# Patient Record
Sex: FEMALE | Race: AMERICAN INDIAN OR ALASKA NATIVE | NOT HISPANIC OR LATINO | Employment: UNEMPLOYED | URBAN - METROPOLITAN AREA
[De-identification: names, ages, dates, MRNs, and addresses within clinical notes are randomized per-mention and may not be internally consistent; named-entity substitution may affect disease eponyms.]

---

## 2017-10-31 ENCOUNTER — TRANSFERRED RECORDS (OUTPATIENT)
Dept: HEALTH INFORMATION MANAGEMENT | Facility: CLINIC | Age: 57
End: 2017-10-31

## 2017-11-02 ENCOUNTER — MEDICAL CORRESPONDENCE (OUTPATIENT)
Dept: HEALTH INFORMATION MANAGEMENT | Facility: CLINIC | Age: 57
End: 2017-11-02

## 2017-12-18 DIAGNOSIS — K76.0 FATTY LIVER: Primary | ICD-10-CM

## 2018-02-07 ENCOUNTER — OFFICE VISIT (OUTPATIENT)
Dept: GASTROENTEROLOGY | Facility: CLINIC | Age: 58
End: 2018-02-07
Attending: INTERNAL MEDICINE
Payer: MEDICARE

## 2018-02-07 VITALS
HEIGHT: 65 IN | SYSTOLIC BLOOD PRESSURE: 149 MMHG | HEART RATE: 73 BPM | TEMPERATURE: 98.2 F | DIASTOLIC BLOOD PRESSURE: 73 MMHG | WEIGHT: 146.5 LBS | BODY MASS INDEX: 24.41 KG/M2 | OXYGEN SATURATION: 98 % | RESPIRATION RATE: 16 BRPM

## 2018-02-07 DIAGNOSIS — K76.0 FATTY LIVER DISEASE, NONALCOHOLIC: Primary | ICD-10-CM

## 2018-02-07 DIAGNOSIS — K76.0 FATTY LIVER: ICD-10-CM

## 2018-02-07 LAB
ALBUMIN SERPL-MCNC: 4.2 G/DL (ref 3.4–5)
ALP SERPL-CCNC: 58 U/L (ref 40–150)
ALT SERPL W P-5'-P-CCNC: 31 U/L (ref 0–50)
ANION GAP SERPL CALCULATED.3IONS-SCNC: 6 MMOL/L (ref 3–14)
AST SERPL W P-5'-P-CCNC: 26 U/L (ref 0–45)
BILIRUB DIRECT SERPL-MCNC: <0.1 MG/DL (ref 0–0.2)
BILIRUB SERPL-MCNC: 0.3 MG/DL (ref 0.2–1.3)
BUN SERPL-MCNC: 8 MG/DL (ref 7–30)
CALCIUM SERPL-MCNC: 9 MG/DL (ref 8.5–10.1)
CHLORIDE SERPL-SCNC: 104 MMOL/L (ref 94–109)
CO2 SERPL-SCNC: 30 MMOL/L (ref 20–32)
CREAT SERPL-MCNC: 0.72 MG/DL (ref 0.52–1.04)
ERYTHROCYTE [DISTWIDTH] IN BLOOD BY AUTOMATED COUNT: 12.8 % (ref 10–15)
GFR SERPL CREATININE-BSD FRML MDRD: 83 ML/MIN/1.7M2
GLUCOSE SERPL-MCNC: 87 MG/DL (ref 70–99)
HCT VFR BLD AUTO: 40.7 % (ref 35–47)
HGB BLD-MCNC: 13.1 G/DL (ref 11.7–15.7)
INR PPP: 0.96 (ref 0.86–1.14)
MCH RBC QN AUTO: 29.6 PG (ref 26.5–33)
MCHC RBC AUTO-ENTMCNC: 32.2 G/DL (ref 31.5–36.5)
MCV RBC AUTO: 92 FL (ref 78–100)
PLATELET # BLD AUTO: 340 10E9/L (ref 150–450)
POTASSIUM SERPL-SCNC: 3.8 MMOL/L (ref 3.4–5.3)
PROT SERPL-MCNC: 8.2 G/DL (ref 6.8–8.8)
RBC # BLD AUTO: 4.42 10E12/L (ref 3.8–5.2)
SODIUM SERPL-SCNC: 140 MMOL/L (ref 133–144)
WBC # BLD AUTO: 7.3 10E9/L (ref 4–11)

## 2018-02-07 PROCEDURE — 80048 BASIC METABOLIC PNL TOTAL CA: CPT | Performed by: INTERNAL MEDICINE

## 2018-02-07 PROCEDURE — 85027 COMPLETE CBC AUTOMATED: CPT | Performed by: INTERNAL MEDICINE

## 2018-02-07 PROCEDURE — 85610 PROTHROMBIN TIME: CPT | Performed by: INTERNAL MEDICINE

## 2018-02-07 PROCEDURE — 36415 COLL VENOUS BLD VENIPUNCTURE: CPT | Performed by: INTERNAL MEDICINE

## 2018-02-07 PROCEDURE — 80076 HEPATIC FUNCTION PANEL: CPT | Performed by: INTERNAL MEDICINE

## 2018-02-07 PROCEDURE — G0463 HOSPITAL OUTPT CLINIC VISIT: HCPCS | Mod: ZF

## 2018-02-07 RX ORDER — VALACYCLOVIR HYDROCHLORIDE 500 MG/1
500 TABLET, FILM COATED ORAL 2 TIMES DAILY
COMMUNITY

## 2018-02-07 RX ORDER — OXYCODONE HYDROCHLORIDE 15 MG/1
15 TABLET, FILM COATED, EXTENDED RELEASE ORAL DAILY
COMMUNITY

## 2018-02-07 RX ORDER — NORTRIPTYLINE HCL 10 MG
10 CAPSULE ORAL AT BEDTIME
COMMUNITY

## 2018-02-07 RX ORDER — HYDROMORPHONE HYDROCHLORIDE 4 MG/1
4 TABLET ORAL EVERY 4 HOURS PRN
COMMUNITY

## 2018-02-07 RX ORDER — ONDANSETRON 4 MG/1
4 TABLET, ORALLY DISINTEGRATING ORAL EVERY 8 HOURS PRN
COMMUNITY

## 2018-02-07 RX ORDER — LACTULOSE 10 G/15ML
10 SOLUTION ORAL DAILY
COMMUNITY

## 2018-02-07 ASSESSMENT — PAIN SCALES - GENERAL: PAINLEVEL: NO PAIN (1)

## 2018-02-07 NOTE — NURSING NOTE
"Chief Complaint   Patient presents with     Consult     Steatosis of liver       Initial /73 (BP Location: Left arm, Patient Position: Sitting, Cuff Size: Adult Regular)  Pulse 73  Temp 98.2  F (36.8  C) (Oral)  Resp 16  Ht 1.651 m (5' 5\")  Wt 66.5 kg (146 lb 8 oz)  SpO2 98%  BMI 24.38 kg/m2 Estimated body mass index is 24.38 kg/(m^2) as calculated from the following:    Height as of this encounter: 1.651 m (5' 5\").    Weight as of this encounter: 66.5 kg (146 lb 8 oz).  Medication Reconciliation: complete     Consuelo Powers [facility]  2/7/2018 12:14 PM        "

## 2018-02-07 NOTE — PROGRESS NOTES
Hepatology Clinic note  Xiomara Rowell   Date of Birth 1960  Date of Service 2/7/2018    REASON FOR CONSULTATION: fatty liver  REFERRING PROVIDER: Dr. Corinna Melchor    -----------------------------------------------------       HPI:   Xiomara Rowell is a 57 year old  female with PMH of high cholesterol, anxiety, depression, on chronic narcotics secondary to back pain presenting for the evaluation of fatty liver.     Patient states was she first told she had fatty liver disease in 2012 and was told to lose weight. Over the following few years, she lost 40 lbs which she eventually felt was too thin. She purposefully gained some weight by eating nightly cereal and whole milk and has been maintained her weight at 145 lbs for the past few years. Per outside records in [facility], patient had elevated LFT's in May 2017. Repeat liver function tests in July 2017 were normal. She denies any changes in her diet or activity during that time. She denies any changes in medications or illnesses to her knowledge. Per outside records, she also had a fibrosis scan showing F0 to F1. EGD in August 2017 showed chronic gastritis, biopsy findings showed chronic focal active gastritis. Negative small bowel biopsies. Today, her ALT/AST are normal. TBili 0.9 Alk Phos 58 Platelets 340 INR 0.96. Her glucose is 87.     Patient denies jaundice, lower extremity edema or confusion. No history of melena, hematemesis or hematochezia. Patient denies fevers, sweats, chills or weight loss.     She does note some abdominal distension. She now eats a primarily planted based diet. She does note some intolerance to wheat products, potatoes, certain meats. She has not tolerated statins in the past. She denies any new medications in the past six months, she is taking decreasing amount of narcotics. She takes lactulose as needed for constipation.     She is a current smoker, 3 cigarettes a day. She started smoking 9 years ago.  Patient does not currently drink. She first started drinking at the age of 15 years. At her peak, she may have drank 12 beers on the weekend which lasted 6 months, which she attributed to post-partem depression. She quit drinking at the age of 40. Last ETOH use was 2009. No previous IV/IN drug use. First tattoo at the age of 16. Patient currently in [place], lives by herself. Children are healthy    Previous work-up:  HCV antibody: negative  HAV Ab: negative  HBV SAb: <1.0     HBV SAg: negative  HBV CAb negative  JOHN: negative  AMSA: negative  AMA: negative  Ferritin: normal   TTG IGG: negative  TTG IGA: negative    Medical hx Surgical hx   Past Medical History:   Diagnosis Date     Allergic state      Anxiety      Arthritis      Depressive disorder     Past Surgical History:   Procedure Laterality Date     CHOLECYSTECTOMY       FUSION CERVICAL ANTERIOR THREE+ LEVELS                   Medications:     Current Outpatient Prescriptions   Medication     oxyCODONE (OXYCONTIN) 15 MG 12 hr tablet     nortriptyline (PAMELOR) 10 MG capsule     HYDROmorphone (DILAUDID) 4 MG tablet     ondansetron (ZOFRAN-ODT) 4 MG ODT tab     lactulose (CHRONULAC) 10 GM/15ML solution     oxyCODONE (ROXICODONE) 10 MG immediate release tablet     valACYclovir (VALTREX) 500 MG tablet     No current facility-administered medications for this visit.             Allergies:     Allergies   Allergen Reactions     Ibuprofen Anaphylaxis     Shrimp Anaphylaxis     [place]-D [Diphenhydramine] Hives     Cymbalta Other (See Comments)     Increases her depression     Lexapro [Escitalopram] Other (See Comments)     Dry mouth, stiff neck     Oxymetazoline Hives     Wellbutrin [Bupropion] Other (See Comments)            Social History:     Social History     Social History     Marital status:      Spouse name: N/A     Number of children: N/A     Years of education: N/A     Occupational History     Not on file.     Social History Main Topics     Smoking  "status: Current Every Day Smoker     Packs/day: 0.25     Years: 6.00     Smokeless tobacco: Never Used     Alcohol use No     Drug use: No     Sexual activity: Not on file     Other Topics Concern     Not on file     Social History Narrative            Family History:   No family history on file.           Review of Systems:   Gen: See HPI   HEENT: No change in vision or hearing, mouth sores, dysphagia, lymph nodes  Resp: No shortness of breath, coughing, hx of asthma  CV: No chest pain, palpitations, syncope   GI: See HPI  : No dysuria, history of stones, urine color    Skin: No rash; no pruritus or psoriasis  MS: No arthralgias, myalgias, joint swelling  Neuro: No memory changes, confusion, numbness    Heme: No difficulty clotting, bruising, bleeding  Psych:  No anxiety, depression, agitation          Physical Exam:   VS:  /73 (BP Location: Left arm, Patient Position: Sitting, Cuff Size: Adult Regular)  Pulse 73  Temp 98.2  F (36.8  C) (Oral)  Resp 16  Ht 1.651 m (5' 5\")  Wt 66.5 kg (146 lb 8 oz)  SpO2 98%  BMI 24.38 kg/m2      Gen: A&Ox3, NAD, well developed  HEENT: non-icteric, no cervical lymphadenopathy, no lesions or ulcers in oropharynx  CV: RRR, no overt murmurs  Lung: CTA Bilatererally, no wheezing or crackles.   Lym- no palpable lymphadenopathy  Abd: soft, NT, ND. no palpable splenomegaly, liver is not palpable.  Ext: no edema, intact pulses. Tattoos on extremity.   Skin: No rash, no palmar erythema, telangiectasias or jaundice  Neuro: grossly intact, no asterixis   Psych: appropriate mood and affects, mildly anxious         Data:   Reviewed in person and significant for:    Lab Results   Component Value Date     02/07/2018      Lab Results   Component Value Date    POTASSIUM 3.8 02/07/2018     Lab Results   Component Value Date    CHLORIDE 104 02/07/2018     Lab Results   Component Value Date    CO2 30 02/07/2018     Lab Results   Component Value Date    BUN 8 02/07/2018     Lab " Results   Component Value Date    CR 0.72 02/07/2018       Lab Results   Component Value Date    WBC 7.3 02/07/2018     Lab Results   Component Value Date    HGB 13.1 02/07/2018     Lab Results   Component Value Date    HCT 40.7 02/07/2018     Lab Results   Component Value Date    MCV 92 02/07/2018     Lab Results   Component Value Date     02/07/2018       Lab Results   Component Value Date    AST 26 02/07/2018     Lab Results   Component Value Date    ALT 31 02/07/2018     No results found for: BILICONJ   Lab Results   Component Value Date    BILITOTAL 0.3 02/07/2018       Lab Results   Component Value Date    ALBUMIN 4.2 02/07/2018     Lab Results   Component Value Date    PROTTOTAL 8.2 02/07/2018      Lab Results   Component Value Date    ALKPHOS 58 02/07/2018       Lab Results   Component Value Date    INR 0.96 02/07/2018     Radiology:   US ABDDOMEN  7/27/17:  The pancreas tail is obscured. Otherwise, main body and head appear negative. Right kidney appears negative measuring 11.3 cm without cortical mass or obstruction. Post cholecystectomy with normal sized common bile duct. The liver appears negative, measures 16 cm. No liver mass is seen.    US Liver Fibroscan:   7/27/17:   1. METAVIR score F0 to F1 compatible with normal to mild fibrosis.  2. S3 score, severe steatosis.           Assessment/plan:   Xiomara Rowell is a 57 year old female referred for the evaluation of fatty liver. Patient has normal liver function and liver function tests today. No recent radiographic evidence of fatty liver. Her significant weight loss in recent years and maintenance of weight loss was instrumental in preventing disease progression. The mild, intermittent right upper quadrant that she experiences is not uncommon with individuals with nonalcoholic fatty liver disease. Discussed the prevalence, significance and natural progression of fatty liver disease, especially in the  population.        -  Maintain current weight  - Continue to eat healthy diet, especially monitoring carbohydrate intake and having regular exercise  - Follow-up in clinic as needed    Noted prepared by:   Rach Arevalo PA-C   [facility] Hepat[facility]     Xiomara Rowell was seen and evaluated today as apart of a shared APRN/PA visit. I reviewed today's history of liver . My key exam findings include no truncal obesity, anicteric sclera, no jaundice. Key management decisions made by me and carried out under my direction include: maintain current weight, continue to eat healthy diet, especially carbohydrate intake and regular exercise, follow-up in clinic as needed    Kwadwo Ragsdale MD      Professor of Medicine  [employer]      Executive Medical Director, Solid Organ Transplant Program  [facility]

## 2018-02-07 NOTE — LETTER
2/7/2018       RE: Xiomara Rowell  [address] [place] [id number]     Dear Colleague,    Thank you for referring your patient, Xiomara Rowell, to the [facility] at [facility]. Please see a copy of my visit note below.    Hepatology Clinic note  Xiomara Rowell   Date of Birth 1960  Date of Service 2/7/2018    REASON FOR CONSULTATION: fatty liver  REFERRING PROVIDER: Dr. Corinna Melchor    -----------------------------------------------------       HPI:   Xiomara Rowell is a 57 year old  female with PMH of high cholesterol, anxiety, depression, on chronic narcotics secondary to back pain presenting for the evaluation of fatty liver.     Patient states was she first told she had fatty liver disease in 2012 and was told to lose weight. Over the following few years, she lost 40 lbs which she eventually felt was too thin. She purposefully gained some weight by eating nightly cereal and whole milk and has been maintained her weight at 145 lbs for the past few years. Per outside records in [facility], patient had elevated LFT's in May 2017. Repeat liver function tests in July 2017 were normal. She denies any changes in her diet or activity during that time. She denies any changes in medications or illnesses to her knowledge. Per outside records, she also had a fibrosis scan showing F0 to F1. EGD in August 2017 showed chronic gastritis, biopsy findings showed chronic focal active gastritis. Negative small bowel biopsies. Today, her ALT/AST are normal. TBili 0.9 Alk Phos 58 Platelets 340 INR 0.96. Her glucose is 87.     Patient denies jaundice, lower extremity edema or confusion. No history of melena, hematemesis or hematochezia. Patient denies fevers, sweats, chills or weight loss.     She does note some abdominal distension. She now eats a primarily planted based diet. She does note some intolerance to wheat products, potatoes,  certain meats. She has not tolerated statins in the past. She denies any new medications in the past six months, she is taking decreasing amount of narcotics. She takes lactulose as needed for constipation.     She is a current smoker, 3 cigarettes a day. She started smoking 9 years ago. Patient does not currently drink. She first started drinking at the age of 15 years. At her peak, she may have drank 12 beers on the weekend which lasted 6 months, which she attributed to post-partem depression. She quit drinking at the age of 40. Last ETOH use was 2009. No previous IV/IN drug use. First tattoo at the age of 16. Patient currently in [place], lives by herself. Children are healthy    Previous work-up:  HCV antibody: negative  HAV Ab: negative  HBV SAb: <1.0     HBV SAg: negative  HBV CAb negative  JOHN: negative  AMSA: negative  AMA: negative  Ferritin: normal   TTG IGG: negative  TTG IGA: negative    Medical hx Surgical hx   Past Medical History:   Diagnosis Date     Allergic state      Anxiety      Arthritis      Depressive disorder     Past Surgical History:   Procedure Laterality Date     CHOLECYSTECTOMY       FUSION CERVICAL ANTERIOR THREE+ LEVELS                   Medications:     Current Outpatient Prescriptions   Medication     oxyCODONE (OXYCONTIN) 15 MG 12 hr tablet     nortriptyline (PAMELOR) 10 MG capsule     HYDROmorphone (DILAUDID) 4 MG tablet     ondansetron (ZOFRAN-ODT) 4 MG ODT tab     lactulose (CHRONULAC) 10 GM/15ML solution     oxyCODONE (ROXICODONE) 10 MG immediate release tablet     valACYclovir (VALTREX) 500 MG tablet     No current facility-administered medications for this visit.             Allergies:     Allergies   Allergen Reactions     Ibuprofen Anaphylaxis     Shrimp Anaphylaxis     [place]-D [Diphenhydramine] Hives     Cymbalta Other (See Comments)     Increases her depression     Lexapro [Escitalopram] Other (See Comments)     Dry mouth, stiff neck     Oxymetazoline Hives      "Wellbutrin [Bupropion] Other (See Comments)            Social History:     Social History     Social History     Marital status:      Spouse name: N/A     Number of children: N/A     Years of education: N/A     Occupational History     Not on file.     Social History Main Topics     Smoking status: Current Every Day Smoker     Packs/day: 0.25     Years: 6.00     Smokeless tobacco: Never Used     Alcohol use No     Drug use: No     Sexual activity: Not on file     Other Topics Concern     Not on file     Social History Narrative            Family History:   No family history on file.           Review of Systems:   Gen: See HPI   HEENT: No change in vision or hearing, mouth sores, dysphagia, lymph nodes  Resp: No shortness of breath, coughing, hx of asthma  CV: No chest pain, palpitations, syncope   GI: See HPI  : No dysuria, history of stones, urine color    Skin: No rash; no pruritus or psoriasis  MS: No arthralgias, myalgias, joint swelling  Neuro: No memory changes, confusion, numbness    Heme: No difficulty clotting, bruising, bleeding  Psych:  No anxiety, depression, agitation          Physical Exam:   VS:  /73 (BP Location: Left arm, Patient Position: Sitting, Cuff Size: Adult Regular)  Pulse 73  Temp 98.2  F (36.8  C) (Oral)  Resp 16  Ht 1.651 m (5' 5\")  Wt 66.5 kg (146 lb 8 oz)  SpO2 98%  BMI 24.38 kg/m2      Gen: A&Ox3, NAD, well developed  HEENT: non-icteric, no cervical lymphadenopathy, no lesions or ulcers in oropharynx  CV: RRR, no overt murmurs  Lung: CTA Bilatererally, no wheezing or crackles.   Lym- no palpable lymphadenopathy  Abd: soft, NT, ND. no palpable splenomegaly, liver is not palpable.  Ext: no edema, intact pulses. Tattoos on extremity.   Skin: No rash, no palmar erythema, telangiectasias or jaundice  Neuro: grossly intact, no asterixis   Psych: appropriate mood and affects, mildly anxious         Data:   Reviewed in person and significant for:    Lab Results "   Component Value Date     02/07/2018      Lab Results   Component Value Date    POTASSIUM 3.8 02/07/2018     Lab Results   Component Value Date    CHLORIDE 104 02/07/2018     Lab Results   Component Value Date    CO2 30 02/07/2018     Lab Results   Component Value Date    BUN 8 02/07/2018     Lab Results   Component Value Date    CR 0.72 02/07/2018       Lab Results   Component Value Date    WBC 7.3 02/07/2018     Lab Results   Component Value Date    HGB 13.1 02/07/2018     Lab Results   Component Value Date    HCT 40.7 02/07/2018     Lab Results   Component Value Date    MCV 92 02/07/2018     Lab Results   Component Value Date     02/07/2018       Lab Results   Component Value Date    AST 26 02/07/2018     Lab Results   Component Value Date    ALT 31 02/07/2018     No results found for: BILICONJ   Lab Results   Component Value Date    BILITOTAL 0.3 02/07/2018       Lab Results   Component Value Date    ALBUMIN 4.2 02/07/2018     Lab Results   Component Value Date    PROTTOTAL 8.2 02/07/2018      Lab Results   Component Value Date    ALKPHOS 58 02/07/2018       Lab Results   Component Value Date    INR 0.96 02/07/2018     Radiology:   US ABDDOMEN  7/27/17:  The pancreas tail is obscured. Otherwise, main body and head appear negative. Right kidney appears negative measuring 11.3 cm without cortical mass or obstruction. Post cholecystectomy with normal sized common bile duct. The liver appears negative, measures 16 cm. No liver mass is seen.    US Liver Fibroscan:   7/27/17:   1. METAVIR score F0 to F1 compatible with normal to mild fibrosis.  2. S3 score, severe steatosis.           Assessment/plan:   Xiomara Rowell is a 57 year old female referred for the evaluation of fatty liver. Patient has normal liver function and liver function tests today. No recent radiographic evidence of fatty liver. Her significant weight loss in recent years and maintenance of weight loss was instrumental in  preventing disease progression. The mild, intermittent right upper quadrant that she experiences is not uncommon with individuals with nonalcoholic fatty liver disease. Discussed the prevalence, significance and natural progression of fatty liver disease, especially in the  population.        - Maintain current weight  - Continue to eat healthy diet, especially monitoring carbohydrate intake and having regular exercise  - Follow-up in clinic as needed    Noted prepared by:   Rach Arevalo PA-C   [facility] Hepatology     Xiomara Rowell was seen and evaluated today as apart of a shared APRN/PA visit. I reviewed today's history of liver . My key exam findings include no truncal obesity, anicteric sclera, no jaundice. Key management decisions made by me and carried out under my direction include: maintain current weight, continue to eat healthy diet, especially carbohydrate intake and regular exercise, follow-up in clinic as needed    Kwadwo Ragsdale MD      Professor of Medicine  [employer]      Executive Medical Director, Solid Organ Transplant Program  [facility]

## 2018-02-07 NOTE — LETTER
2/7/2018        RE: Xiomara Rowell  [address] [place] [id number]     Dear Colleague,    Thank you for referring your patient, Xiomara Rowell, to the [facility] HEPATOLOGY at [facility]. Please see a copy of my visit note below.    Hepatology Clinic note  Xiomara Rowell   Date of Birth 1960  Date of Service 2/7/2018    REASON FOR CONSULTATION: fatty liver  REFERRING PROVIDER: Dr. Corinna Melchor    -----------------------------------------------------       HPI:   Xiomara Rowell is a 57 year old  female with PMH of high cholesterol, anxiety, depression, on chronic narcotics secondary to back pain presenting for the evaluation of fatty liver.     Patient states was she first told she had fatty liver disease in 2012 and was told to lose weight. Over the following few years, she lost 40 lbs which she eventually felt was too thin. She purposefully gained some weight by eating nightly cereal and whole milk and has been maintained her weight at 145 lbs for the past few years. Per outside records in [facility], patient had elevated LFT's in May 2017. Repeat liver function tests in July 2017 were normal. She denies any changes in her diet or activity during that time. She denies any changes in medications or illnesses to her knowledge. Per outside records, she also had a fibrosis scan showing F0 to F1. EGD in August 2017 showed chronic gastritis, biopsy findings showed chronic focal active gastritis. Negative small bowel biopsies. Today, her ALT/AST are normal. TBili 0.9 Alk Phos 58 Platelets 340 INR 0.96. Her glucose is 87.     Patient denies jaundice, lower extremity edema or confusion. No history of melena, hematemesis or hematochezia. Patient denies fevers, sweats, chills or weight loss.     She does note some abdominal distension. She now eats a primarily planted based diet. She does note some intolerance to wheat products, potatoes,  certain meats. She has not tolerated statins in the past. She denies any new medications in the past six months, she is taking decreasing amount of narcotics. She takes lactulose as needed for constipation.     She is a current smoker, 3 cigarettes a day. She started smoking 9 years ago. Patient does not currently drink. She first started drinking at the age of 15 years. At her peak, she may have drank 12 beers on the weekend which lasted 6 months, which she attributed to post-partem depression. She quit drinking at the age of 40. Last ETOH use was 2009. No previous IV/IN drug use. First tattoo at the age of 16. Patient currently in [place], lives by herself. Children are healthy    Previous work-up:  HCV antibody: negative  HAV Ab: negative  HBV SAb: <1.0     HBV SAg: negative  HBV CAb negative  JOHN: negative  AMSA: negative  AMA: negative  Ferritin: normal   TTG IGG: negative  TTG IGA: negative    Medical hx Surgical hx   Past Medical History:   Diagnosis Date     Allergic state      Anxiety      Arthritis      Depressive disorder     Past Surgical History:   Procedure Laterality Date     CHOLECYSTECTOMY       FUSION CERVICAL ANTERIOR THREE+ LEVELS                   Medications:     Current Outpatient Prescriptions   Medication     oxyCODONE (OXYCONTIN) 15 MG 12 hr tablet     nortriptyline (PAMELOR) 10 MG capsule     HYDROmorphone (DILAUDID) 4 MG tablet     ondansetron (ZOFRAN-ODT) 4 MG ODT tab     lactulose (CHRONULAC) 10 GM/15ML solution     oxyCODONE (ROXICODONE) 10 MG immediate release tablet     valACYclovir (VALTREX) 500 MG tablet     No current facility-administered medications for this visit.             Allergies:     Allergies   Allergen Reactions     Ibuprofen Anaphylaxis     Shrimp Anaphylaxis     [place]-D [Diphenhydramine] Hives     Cymbalta Other (See Comments)     Increases her depression     Lexapro [Escitalopram] Other (See Comments)     Dry mouth, stiff neck     Oxymetazoline Hives      "Wellbutrin [Bupropion] Other (See Comments)            Social History:     Social History     Social History     Marital status:      Spouse name: N/A     Number of children: N/A     Years of education: N/A     Occupational History     Not on file.     Social History Main Topics     Smoking status: Current Every Day Smoker     Packs/day: 0.25     Years: 6.00     Smokeless tobacco: Never Used     Alcohol use No     Drug use: No     Sexual activity: Not on file     Other Topics Concern     Not on file     Social History Narrative            Family History:   No family history on file.           Review of Systems:   Gen: See HPI   HEENT: No change in vision or hearing, mouth sores, dysphagia, lymph nodes  Resp: No shortness of breath, coughing, hx of asthma  CV: No chest pain, palpitations, syncope   GI: See HPI  : No dysuria, history of stones, urine color    Skin: No rash; no pruritus or psoriasis  MS: No arthralgias, myalgias, joint swelling  Neuro: No memory changes, confusion, numbness    Heme: No difficulty clotting, bruising, bleeding  Psych:  No anxiety, depression, agitation          Physical Exam:   VS:  /73 (BP Location: Left arm, Patient Position: Sitting, Cuff Size: Adult Regular)  Pulse 73  Temp 98.2  F (36.8  C) (Oral)  Resp 16  Ht 1.651 m (5' 5\")  Wt 66.5 kg (146 lb 8 oz)  SpO2 98%  BMI 24.38 kg/m2      Gen: A&Ox3, NAD, well developed  HEENT: non-icteric, no cervical lymphadenopathy, no lesions or ulcers in oropharynx  CV: RRR, no overt murmurs  Lung: CTA Bilatererally, no wheezing or crackles.   Lym- no palpable lymphadenopathy  Abd: soft, NT, ND. no palpable splenomegaly, liver is not palpable.  Ext: no edema, intact pulses. Tattoos on extremity.   Skin: No rash, no palmar erythema, telangiectasias or jaundice  Neuro: grossly intact, no asterixis   Psych: appropriate mood and affects, mildly anxious         Data:   Reviewed in person and significant for:    Lab Results "   Component Value Date     02/07/2018      Lab Results   Component Value Date    POTASSIUM 3.8 02/07/2018     Lab Results   Component Value Date    CHLORIDE 104 02/07/2018     Lab Results   Component Value Date    CO2 30 02/07/2018     Lab Results   Component Value Date    BUN 8 02/07/2018     Lab Results   Component Value Date    CR 0.72 02/07/2018       Lab Results   Component Value Date    WBC 7.3 02/07/2018     Lab Results   Component Value Date    HGB 13.1 02/07/2018     Lab Results   Component Value Date    HCT 40.7 02/07/2018     Lab Results   Component Value Date    MCV 92 02/07/2018     Lab Results   Component Value Date     02/07/2018       Lab Results   Component Value Date    AST 26 02/07/2018     Lab Results   Component Value Date    ALT 31 02/07/2018     No results found for: BILICONJ   Lab Results   Component Value Date    BILITOTAL 0.3 02/07/2018       Lab Results   Component Value Date    ALBUMIN 4.2 02/07/2018     Lab Results   Component Value Date    PROTTOTAL 8.2 02/07/2018      Lab Results   Component Value Date    ALKPHOS 58 02/07/2018       Lab Results   Component Value Date    INR 0.96 02/07/2018     Radiology:   US ABDDOMEN  7/27/17:  The pancreas tail is obscured. Otherwise, main body and head appear negative. Right kidney appears negative measuring 11.3 cm without cortical mass or obstruction. Post cholecystectomy with normal sized common bile duct. The liver appears negative, measures 16 cm. No liver mass is seen.    US Liver Fibroscan:   7/27/17:   1. METAVIR score F0 to F1 compatible with normal to mild fibrosis.  2. S3 score, severe steatosis.           Assessment/plan:   Xiomara Rowell is a 57 year old female referred for the evaluation of fatty liver. Patient has normal liver function and liver function tests today. No recent radiographic evidence of fatty liver. Her significant weight loss in recent years and maintenance of weight loss was instrumental in  preventing disease progression. The mild, intermittent right upper quadrant that she experiences is not uncommon with individuals with nonalcoholic fatty liver disease. Discussed the prevalence, significance and natural progression of fatty liver disease, especially in the  population.        - Maintain current weight  - Continue to eat healthy diet, especially monitoring carbohydrate intake and having regular exercise  - Follow-up in clinic as needed    Noted prepared by:   Rach Arevalo PA-C   [facility] Hepatology     Xiomara Rowell was seen and evaluated today as apart of a shared APRN/PA visit. I reviewed today's history of liver . My key exam findings include no truncal obesity, anicteric sclera, no jaundice. Key management decisions made by me and carried out under my direction include: maintain current weight, continue to eat healthy diet, especially carbohydrate intake and regular exercise, follow-up in clinic as needed    Kwadwo Ragsdale MD      Professor of Medicine  [facility] [employer]      Executive Medical Director, Solid Organ Transplant Program  [facility]

## 2018-02-07 NOTE — LETTER
2/7/2018      RE: Xiomara Rowell  [address] [place] [id number]       Hepatology Clinic note  Xiomara Rowell   Date of Birth 1960  Date of Service 2/7/2018    REASON FOR CONSULTATION: fatty liver  REFERRING PROVIDER: Dr. Corinna Melchor    -----------------------------------------------------       HPI:   Xiomara Rowell is a 57 year old  female with PMH of high cholesterol, anxiety, depression, on chronic narcotics secondary to back pain presenting for the evaluation of fatty liver.     Patient states was she first told she had fatty liver disease in 2012 and was told to lose weight. Over the following few years, she lost 40 lbs which she eventually felt was too thin. She purposefully gained some weight by eating nightly cereal and whole milk and has been maintained her weight at 145 lbs for the past few years. Per outside records in [facility], patient had elevated LFT's in May 2017. Repeat liver function tests in July 2017 were normal. She denies any changes in her diet or activity during that time. She denies any changes in medications or illnesses to her knowledge. Per outside records, she also had a fibrosis scan showing F0 to F1. EGD in August 2017 showed chronic gastritis, biopsy findings showed chronic focal active gastritis. Negative small bowel biopsies. Today, her ALT/AST are normal. TBili 0.9 Alk Phos 58 Platelets 340 INR 0.96. Her glucose is 87.     Patient denies jaundice, lower extremity edema or confusion. No history of melena, hematemesis or hematochezia. Patient denies fevers, sweats, chills or weight loss.     She does note some abdominal distension. She now eats a primarily planted based diet. She does note some intolerance to wheat products, potatoes, certain meats. She has not tolerated statins in the past. She denies any new medications in the past six months, she is taking decreasing amount of narcotics. She takes lactulose as needed for  constipation.     She is a current smoker, 3 cigarettes a day. She started smoking 9 years ago. Patient does not currently drink. She first started drinking at the age of 15 years. At her peak, she may have drank 12 beers on the weekend which lasted 6 months, which she attributed to post-partem depression. She quit drinking at the age of 40. Last ETOH use was 2009. No previous IV/IN drug use. First tattoo at the age of 16. Patient currently in [place], lives by herself. Children are healthy    Previous work-up:  HCV antibody: negative  HAV Ab: negative  HBV SAb: <1.0     HBV SAg: negative  HBV CAb negative  JOHN: negative  AMSA: negative  AMA: negative  Ferritin: normal   TTG IGG: negative  TTG IGA: negative    Medical hx Surgical hx   Past Medical History:   Diagnosis Date     Allergic state      Anxiety      Arthritis      Depressive disorder     Past Surgical History:   Procedure Laterality Date     CHOLECYSTECTOMY       FUSION CERVICAL ANTERIOR THREE+ LEVELS                   Medications:     Current Outpatient Prescriptions   Medication     oxyCODONE (OXYCONTIN) 15 MG 12 hr tablet     nortriptyline (PAMELOR) 10 MG capsule     HYDROmorphone (DILAUDID) 4 MG tablet     ondansetron (ZOFRAN-ODT) 4 MG ODT tab     lactulose (CHRONULAC) 10 GM/15ML solution     oxyCODONE (ROXICODONE) 10 MG immediate release tablet     valACYclovir (VALTREX) 500 MG tablet     No current facility-administered medications for this visit.             Allergies:     Allergies   Allergen Reactions     Ibuprofen Anaphylaxis     Shrimp Anaphylaxis     [place]-D [Diphenhydramine] Hives     Cymbalta Other (See Comments)     Increases her depression     Lexapro [Escitalopram] Other (See Comments)     Dry mouth, stiff neck     Oxymetazoline Hives     Wellbutrin [Bupropion] Other (See Comments)            Social History:     Social History     Social History     Marital status:      Spouse name: N/A     Number of children: N/A     Years of  "education: N/A     Occupational History     Not on file.     Social History Main Topics     Smoking status: Current Every Day Smoker     Packs/day: 0.25     Years: 6.00     Smokeless tobacco: Never Used     Alcohol use No     Drug use: No     Sexual activity: Not on file     Other Topics Concern     Not on file     Social History Narrative            Family History:   No family history on file.           Review of Systems:   Gen: See HPI   HEENT: No change in vision or hearing, mouth sores, dysphagia, lymph nodes  Resp: No shortness of breath, coughing, hx of asthma  CV: No chest pain, palpitations, syncope   GI: See HPI  : No dysuria, history of stones, urine color    Skin: No rash; no pruritus or psoriasis  MS: No arthralgias, myalgias, joint swelling  Neuro: No memory changes, confusion, numbness    Heme: No difficulty clotting, bruising, bleeding  Psych:  No anxiety, depression, agitation          Physical Exam:   VS:  /73 (BP Location: Left arm, Patient Position: Sitting, Cuff Size: Adult Regular)  Pulse 73  Temp 98.2  F (36.8  C) (Oral)  Resp 16  Ht 1.651 m (5' 5\")  Wt 66.5 kg (146 lb 8 oz)  SpO2 98%  BMI 24.38 kg/m2      Gen: A&Ox3, NAD, well developed  HEENT: non-icteric, no cervical lymphadenopathy, no lesions or ulcers in oropharynx  CV: RRR, no overt murmurs  Lung: CTA Bilatererally, no wheezing or crackles.   Lym- no palpable lymphadenopathy  Abd: soft, NT, ND. no palpable splenomegaly, liver is not palpable.  Ext: no edema, intact pulses. Tattoos on extremity.   Skin: No rash, no palmar erythema, telangiectasias or jaundice  Neuro: grossly intact, no asterixis   Psych: appropriate mood and affects, mildly anxious         Data:   Reviewed in person and significant for:    Lab Results   Component Value Date     02/07/2018      Lab Results   Component Value Date    POTASSIUM 3.8 02/07/2018     Lab Results   Component Value Date    CHLORIDE 104 02/07/2018     Lab Results   Component " Value Date    CO2 30 02/07/2018     Lab Results   Component Value Date    BUN 8 02/07/2018     Lab Results   Component Value Date    CR 0.72 02/07/2018       Lab Results   Component Value Date    WBC 7.3 02/07/2018     Lab Results   Component Value Date    HGB 13.1 02/07/2018     Lab Results   Component Value Date    HCT 40.7 02/07/2018     Lab Results   Component Value Date    MCV 92 02/07/2018     Lab Results   Component Value Date     02/07/2018       Lab Results   Component Value Date    AST 26 02/07/2018     Lab Results   Component Value Date    ALT 31 02/07/2018     No results found for: BILICONJ   Lab Results   Component Value Date    BILITOTAL 0.3 02/07/2018       Lab Results   Component Value Date    ALBUMIN 4.2 02/07/2018     Lab Results   Component Value Date    PROTTOTAL 8.2 02/07/2018      Lab Results   Component Value Date    ALKPHOS 58 02/07/2018       Lab Results   Component Value Date    INR 0.96 02/07/2018     Radiology:   US ABDDOMEN  7/27/17:  The pancreas tail is obscured. Otherwise, main body and head appear negative. Right kidney appears negative measuring 11.3 cm without cortical mass or obstruction. Post cholecystectomy with normal sized common bile duct. The liver appears negative, measures 16 cm. No liver mass is seen.    US Liver Fibroscan:   7/27/17:   1. METAVIR score F0 to F1 compatible with normal to mild fibrosis.  2. S3 score, severe steatosis.           Assessment/plan:   Xiomara Rowell is a 57 year old female referred for the evaluation of fatty liver. Patient has normal liver function and liver function tests today. No recent radiographic evidence of fatty liver. Her significant weight loss in recent years and maintenance of weight loss was instrumental in preventing disease progression. The mild, intermittent right upper quadrant that she experiences is not uncommon with individuals with nonalcoholic fatty liver disease. Discussed the prevalence, significance and  natural progression of fatty liver disease, especially in the  population.        - Maintain current weight  - Continue to eat healthy diet, especially monitoring carbohydrate intake and having regular exercise  - Follow-up in clinic as needed    Noted prepared by:   Rach Arevalo PA-C   [facility] Hepat[facility]     Xiomara Rowell was seen and evaluated today as apart of a shared APRN/PA visit. I reviewed today's history of liver . My key exam findings include no truncal obesity, anicteric sclera, no jaundice. Key management decisions made by me and carried out under my direction include: maintain current weight, continue to eat healthy diet, especially carbohydrate intake and regular exercise, follow-up in clinic as needed    Kwadwo Ragsdale MD      Professor of Medicine  [employer]      Executive Medical Director, Solid Organ Transplant Program  [facility]      Kwadwo Ragsdale MD

## 2018-02-10 ENCOUNTER — HEALTH MAINTENANCE LETTER (OUTPATIENT)
Age: 58
End: 2018-02-10

## 2019-11-03 ENCOUNTER — HEALTH MAINTENANCE LETTER (OUTPATIENT)
Age: 59
End: 2019-11-03

## 2020-02-10 ENCOUNTER — HEALTH MAINTENANCE LETTER (OUTPATIENT)
Age: 60
End: 2020-02-10

## 2020-11-16 ENCOUNTER — HEALTH MAINTENANCE LETTER (OUTPATIENT)
Age: 60
End: 2020-11-16

## 2021-04-03 ENCOUNTER — HEALTH MAINTENANCE LETTER (OUTPATIENT)
Age: 61
End: 2021-04-03

## 2021-09-18 ENCOUNTER — HEALTH MAINTENANCE LETTER (OUTPATIENT)
Age: 61
End: 2021-09-18

## 2021-11-13 ENCOUNTER — HEALTH MAINTENANCE LETTER (OUTPATIENT)
Age: 61
End: 2021-11-13

## 2022-04-24 ENCOUNTER — HEALTH MAINTENANCE LETTER (OUTPATIENT)
Age: 62
End: 2022-04-24

## 2022-06-11 ENCOUNTER — OFFICE VISIT (OUTPATIENT)
Dept: FAMILY MEDICINE | Facility: CLINIC | Age: 62
End: 2022-06-11
Payer: COMMERCIAL

## 2022-06-11 VITALS
TEMPERATURE: 97.2 F | HEART RATE: 78 BPM | DIASTOLIC BLOOD PRESSURE: 78 MMHG | OXYGEN SATURATION: 97 % | SYSTOLIC BLOOD PRESSURE: 118 MMHG

## 2022-06-11 DIAGNOSIS — J04.0 LARYNGITIS: Primary | ICD-10-CM

## 2022-06-11 PROCEDURE — 99203 OFFICE O/P NEW LOW 30 MIN: CPT

## 2022-06-11 RX ORDER — LIDOCAINE 50 MG/G
1 PATCH TOPICAL EVERY 24 HOURS
COMMUNITY

## 2022-06-11 RX ORDER — ORPHENADRINE CITRATE 100 MG/1
TABLET, EXTENDED RELEASE ORAL
COMMUNITY
Start: 2021-11-03

## 2022-06-11 RX ORDER — HYDROXYZINE HYDROCHLORIDE 25 MG/1
25 TABLET, FILM COATED ORAL
COMMUNITY

## 2022-06-11 NOTE — PATIENT INSTRUCTIONS
Chloraseptic spray (or generic(:  use as gargle every 2-3 hours as needed while awake for the next couple of days.    Salt water gargles in between Chloraseptic doses as needed.    Throat Coat tea is another option to look at to soothe your throat.    Rest your voice as much as possible.    Follow up with primary care in the next few weeks.    Do a COVID test at home to make sure it's not that.

## 2022-06-11 NOTE — PROGRESS NOTES
ASSESSMENT:    ICD-10-CM    1. Laryngitis  J04.0      Likely viral.  No strep exposure and very low Centor score, so will skip testing for this.  Pt declines testing for influenza B and declines COVID test since we don't have a rapid option.    PLAN:  Patient Instructions   Chloraseptic spray (or generic(:  use as gargle every 2-3 hours as needed while awake for the next couple of days.    Salt water gargles in between Chloraseptic doses as needed.    Throat Coat tea is another option to look at to soothe your throat.    Rest your voice as much as possible.    Follow up with primary care in the next few weeks.    Do a COVID test at home to make sure it's not that.        Discussed with patient that her concerns about her chronic conditions (GERD medication issues and bilateral leg pain) would be best addressed with her primary care physician.    SUBJECTIVE:  Xiomara Rowell is a 61 year old female who presents to  today with sore throat and loss of voice x 1 day.  No fever.  Known exposure to flu B in her grandchildren.  She has a history of GERD and normally doesn't eat after 6 pm.  Last night she decided to eat some pepperoni pizza with her family around 9 pm, and she had significant heartburn following this.  She isn't sure if the sore throat is related to GERD or to something contagious.  She normally uses omeprazole for her GERD and took 20 mg of this last night.  She states that she can only use this for about 7-10 days in a row before she starts getting diffuse muscle/joint aches.    She also reports increasing bilateral leg cramping.  This is a chronic issue for her.  She hasn't addressed this with her primary care provider recently.    OBJECTIVE:  /78   Pulse 78   Temp 97.2  F (36.2  C) (Tympanic)   SpO2 97%   GEN: well-appearing, in NAD  ENT: TMs normal, oral MMM, posterior pharynx with moderate injection, no tonsil enlargement or exudates, uvula midline.  Voice is hoarse.  Neck: no  LAD  Lungs:  CTAB

## 2022-11-19 ENCOUNTER — HEALTH MAINTENANCE LETTER (OUTPATIENT)
Age: 62
End: 2022-11-19

## 2023-06-01 ENCOUNTER — HEALTH MAINTENANCE LETTER (OUTPATIENT)
Age: 63
End: 2023-06-01

## 2023-10-08 NOTE — MR AVS SNAPSHOT
"              After Visit Summary   2/7/2018    Xiomara Rowell    MRN: [id number]           Patient Information     Date Of Birth          1960        Visit Information        Provider Department      2/7/2018 12:00 PM Kwadwo Ragsdale MD [facility] Hepatology        Today's Diagnoses     Fatty liver disease, nonalcoholic    -  1       Follow-ups after your visit        Follow-up notes from your care team     Return if symptoms worsen or fail to improve.      Who to contact     If you have questions or need follow up information about today's clinic visit or your schedule please contact [facility] HEPATOLOG[facility] directly at [phone].  Normal or non-critical lab and imaging results will be communicated to you by MyChart, letter or phone within 4 business days after the clinic has received the results. If you do not hear from us within 7 days, please contact the clinic through [employer] or phone. If you have a critical or abnormal lab result, we will notify you by phone as soon as possible.  Submit refill requests through [employer] or call your pharmacy and they will forward the refill request to us. Please allow 3 business days for your refill to be completed.          Additional Information About Your Visit        MyChart Information     [employer] gives you secure access to your electronic health record. If you see a primary care provider, you can also send messages to your care team and make appointments. If you have questions, please call your primary care clinic.  If you do not have a primary care provider, please call [phone] and they will assist you.        Care EveryWhere ID     This is your Care EveryWhere ID. This could be used by other organizations to access your [facility] medical records  [id number]        Your Vitals Were     Pulse Temperature Respirations Height Pulse Oximetry BMI (Body Mass Index)    73 98.2  F (36.8  C) (Oral) 16 1.651 m (5' 5\") 98% 24.38 kg/m2       Blood Pressure from Last 3 " Encounters:   02/07/18 149/73   10/27/14 147/82   10/08/14 135/82    Weight from Last 3 Encounters:   02/07/18 66.5 kg (146 lb 8 oz)   10/27/14 65.8 kg (145 lb)   10/07/14 67.1 kg (148 lb)              Today, you had the following     No orders found for display         Today's Medication Changes          These changes are accurate as of 2/7/18 11:59 PM.  If you have any questions, ask your nurse or doctor.               These medicines have changed or have updated prescriptions.        Dose/Directions    HYDROmorphone 4 MG tablet   Commonly known as:  DILAUDID   This may have changed:  Another medication with the same name was removed. Continue taking this medication, and follow the directions you see here.   Changed by:  Kwadwo Ragsdale MD        Dose:  4 mg   Take 4 mg by mouth every 4 hours as needed for moderate to severe pain   Refills:  0                Primary Care Provider Office Phone # Fax #    Brett Hu [phone]       [facility] [address] [place] [address]        Equal Access to Services     [facility] AH: Hadii aad ku hadasho Soomaali, waaxda luqadaha, qaybta kaalmada adeegana cristina, michael awan . So [facility] [phone].    ATENCIÓN: Si habla español, tiene a armando disposición servicios gratuitos de asistencia lingüística. Daisy al [phone].    We comply with applicable federal civil rights laws and [place] laws. We do not discriminate on the basis of race, color, national origin, age, disability, sex, sexual orientation, or gender identity.            Thank you!     Thank you for choosing [facility] HEPATOLOGY  for your care. Our goal is always to provide you with excellent care. Hearing back from our patients is one way we can continue to improve our services. Please take a few minutes to complete the written survey that you may receive in the mail after your visit with us. Thank you!             Your Updated Medication List - Protect others  around you: Learn how to safely use, store and throw away your medicines at www.disposemymeds.org.          This list is accurate as of 2/7/18 11:59 PM.  Always use your most recent med list.                   Brand Name Dispense Instructions for use Diagnosis    HYDROmorphone 4 MG tablet    DILAUDID     Take 4 mg by mouth every 4 hours as needed for moderate to severe pain        lactulose 10 GM/15ML solution    CHRONULAC     Take 10 g by mouth daily        nortriptyline 10 MG capsule    PAMELOR     Take 10 mg by mouth At Bedtime        ondansetron 4 MG ODT tab    ZOFRAN-ODT     Take 4 mg by mouth every 8 hours as needed for nausea        * oxyCODONE IR 10 MG tablet    ROXICODONE     Take 10 mg by mouth 4 times daily        * OXYCONTIN 15 MG 12 hr tablet   Generic drug:  oxyCODONE      Take 15 mg by mouth daily        valACYclovir 500 MG tablet    VALTREX     Take 500 mg by mouth 2 times daily        * Notice:  This list has 2 medication(s) that are the same as other medications prescribed for you. Read the directions carefully, and ask your doctor or other care provider to review them with you.       as evidenced by dx of intestinal obstruction

## 2023-11-19 ENCOUNTER — HEALTH MAINTENANCE LETTER (OUTPATIENT)
Age: 63
End: 2023-11-19

## 2024-06-16 ENCOUNTER — HEALTH MAINTENANCE LETTER (OUTPATIENT)
Age: 64
End: 2024-06-16

## 2025-02-03 ENCOUNTER — HOSPITAL ENCOUNTER (EMERGENCY)
Facility: CLINIC | Age: 65
Discharge: HOME | End: 2025-02-03
Attending: EMERGENCY MEDICINE | Admitting: EMERGENCY MEDICINE
Payer: MEDICARE

## 2025-02-03 ENCOUNTER — APPOINTMENT (OUTPATIENT)
Dept: CT IMAGING | Facility: CLINIC | Age: 65
End: 2025-02-03
Attending: EMERGENCY MEDICINE
Payer: MEDICARE

## 2025-02-03 VITALS
WEIGHT: 146 LBS | BODY MASS INDEX: 24.32 KG/M2 | DIASTOLIC BLOOD PRESSURE: 59 MMHG | HEIGHT: 65 IN | HEART RATE: 107 BPM | SYSTOLIC BLOOD PRESSURE: 99 MMHG | OXYGEN SATURATION: 96 % | TEMPERATURE: 99.6 F | RESPIRATION RATE: 18 BRPM

## 2025-02-03 DIAGNOSIS — R93.89 ABNORMAL CT SCAN: ICD-10-CM

## 2025-02-03 LAB
ALBUMIN SERPL BCG-MCNC: 4.7 G/DL (ref 3.5–5.2)
ALP SERPL-CCNC: 68 U/L (ref 40–150)
ALT SERPL W P-5'-P-CCNC: 20 U/L (ref 0–50)
ANION GAP SERPL CALCULATED.3IONS-SCNC: 12 MMOL/L (ref 7–15)
AST SERPL W P-5'-P-CCNC: 25 U/L (ref 0–45)
BASOPHILS # BLD AUTO: 0 10E3/UL (ref 0–0.2)
BASOPHILS NFR BLD AUTO: 0 %
BILIRUB SERPL-MCNC: 0.4 MG/DL
BUN SERPL-MCNC: 10 MG/DL (ref 8–23)
CALCIUM SERPL-MCNC: 9.3 MG/DL (ref 8.8–10.4)
CHLORIDE SERPL-SCNC: 103 MMOL/L (ref 98–107)
CREAT SERPL-MCNC: 0.74 MG/DL (ref 0.51–0.95)
EGFRCR SERPLBLD CKD-EPI 2021: 90 ML/MIN/1.73M2
EOSINOPHIL # BLD AUTO: 0 10E3/UL (ref 0–0.7)
EOSINOPHIL NFR BLD AUTO: 0 %
ERYTHROCYTE [DISTWIDTH] IN BLOOD BY AUTOMATED COUNT: 13.8 % (ref 10–15)
GLUCOSE SERPL-MCNC: 121 MG/DL (ref 70–99)
HCO3 SERPL-SCNC: 26 MMOL/L (ref 22–29)
HCT VFR BLD AUTO: 39.7 % (ref 35–47)
HGB BLD-MCNC: 13.3 G/DL (ref 11.7–15.7)
HOLD SPECIMEN: NORMAL
IMM GRANULOCYTES # BLD: 0 10E3/UL
IMM GRANULOCYTES NFR BLD: 0 %
LYMPHOCYTES # BLD AUTO: 1.3 10E3/UL (ref 0.8–5.3)
LYMPHOCYTES NFR BLD AUTO: 14 %
MCH RBC QN AUTO: 29.6 PG (ref 26.5–33)
MCHC RBC AUTO-ENTMCNC: 33.5 G/DL (ref 31.5–36.5)
MCV RBC AUTO: 88 FL (ref 78–100)
MONOCYTES # BLD AUTO: 0.5 10E3/UL (ref 0–1.3)
MONOCYTES NFR BLD AUTO: 5 %
NEUTROPHILS # BLD AUTO: 7.6 10E3/UL (ref 1.6–8.3)
NEUTROPHILS NFR BLD AUTO: 81 %
NRBC # BLD AUTO: 0 10E3/UL
NRBC BLD AUTO-RTO: 0 /100
PLATELET # BLD AUTO: 348 10E3/UL (ref 150–450)
POTASSIUM SERPL-SCNC: 3.8 MMOL/L (ref 3.4–5.3)
PROT SERPL-MCNC: 7.7 G/DL (ref 6.4–8.3)
RBC # BLD AUTO: 4.49 10E6/UL (ref 3.8–5.2)
SODIUM SERPL-SCNC: 141 MMOL/L (ref 135–145)
WBC # BLD AUTO: 9.5 10E3/UL (ref 4–11)

## 2025-02-03 PROCEDURE — 96376 TX/PRO/DX INJ SAME DRUG ADON: CPT | Performed by: EMERGENCY MEDICINE

## 2025-02-03 PROCEDURE — 85025 COMPLETE CBC W/AUTO DIFF WBC: CPT | Performed by: EMERGENCY MEDICINE

## 2025-02-03 PROCEDURE — 96374 THER/PROPH/DIAG INJ IV PUSH: CPT | Mod: 59 | Performed by: EMERGENCY MEDICINE

## 2025-02-03 PROCEDURE — 36415 COLL VENOUS BLD VENIPUNCTURE: CPT | Performed by: EMERGENCY MEDICINE

## 2025-02-03 PROCEDURE — 99284 EMERGENCY DEPT VISIT MOD MDM: CPT | Performed by: EMERGENCY MEDICINE

## 2025-02-03 PROCEDURE — 99285 EMERGENCY DEPT VISIT HI MDM: CPT | Mod: 25 | Performed by: EMERGENCY MEDICINE

## 2025-02-03 PROCEDURE — 96375 TX/PRO/DX INJ NEW DRUG ADDON: CPT | Performed by: EMERGENCY MEDICINE

## 2025-02-03 PROCEDURE — 250N000009 HC RX 250: Performed by: EMERGENCY MEDICINE

## 2025-02-03 PROCEDURE — 80053 COMPREHEN METABOLIC PANEL: CPT | Performed by: EMERGENCY MEDICINE

## 2025-02-03 PROCEDURE — 250N000011 HC RX IP 250 OP 636: Performed by: EMERGENCY MEDICINE

## 2025-02-03 PROCEDURE — 74178 CT ABD&PLV WO CNTR FLWD CNTR: CPT

## 2025-02-03 RX ORDER — HYDROMORPHONE HYDROCHLORIDE 1 MG/ML
0.5 INJECTION, SOLUTION INTRAMUSCULAR; INTRAVENOUS; SUBCUTANEOUS
Status: DISCONTINUED | OUTPATIENT
Start: 2025-02-03 | End: 2025-02-03 | Stop reason: HOSPADM

## 2025-02-03 RX ORDER — METHYLPREDNISOLONE SODIUM SUCCINATE 125 MG/2ML
125 INJECTION INTRAMUSCULAR; INTRAVENOUS ONCE
Status: COMPLETED | OUTPATIENT
Start: 2025-02-03 | End: 2025-02-03

## 2025-02-03 RX ORDER — IOPAMIDOL 755 MG/ML
71 INJECTION, SOLUTION INTRAVASCULAR ONCE
Status: COMPLETED | OUTPATIENT
Start: 2025-02-03 | End: 2025-02-03

## 2025-02-03 RX ORDER — IOPAMIDOL 755 MG/ML
100 INJECTION, SOLUTION INTRAVASCULAR ONCE
Status: DISCONTINUED | OUTPATIENT
Start: 2025-02-03 | End: 2025-02-03 | Stop reason: HOSPADM

## 2025-02-03 RX ORDER — ONDANSETRON 2 MG/ML
4 INJECTION INTRAMUSCULAR; INTRAVENOUS ONCE
Status: COMPLETED | OUTPATIENT
Start: 2025-02-03 | End: 2025-02-03

## 2025-02-03 RX ADMIN — HYDROMORPHONE HYDROCHLORIDE 0.5 MG: 1 INJECTION, SOLUTION INTRAMUSCULAR; INTRAVENOUS; SUBCUTANEOUS at 20:26

## 2025-02-03 RX ADMIN — SODIUM CHLORIDE 100 ML: 9 INJECTION, SOLUTION INTRAVENOUS at 17:48

## 2025-02-03 RX ADMIN — HYDROMORPHONE HYDROCHLORIDE 0.5 MG: 1 INJECTION, SOLUTION INTRAMUSCULAR; INTRAVENOUS; SUBCUTANEOUS at 18:43

## 2025-02-03 RX ADMIN — IOPAMIDOL 71 ML: 755 INJECTION, SOLUTION INTRAVENOUS at 17:48

## 2025-02-03 RX ADMIN — ONDANSETRON 4 MG: 2 INJECTION INTRAMUSCULAR; INTRAVENOUS at 16:51

## 2025-02-03 RX ADMIN — METHYLPREDNISOLONE SODIUM SUCCINATE 125 MG: 125 INJECTION, POWDER, FOR SOLUTION INTRAMUSCULAR; INTRAVENOUS at 16:51

## 2025-02-03 ASSESSMENT — ACTIVITIES OF DAILY LIVING (ADL)
ADLS_ACUITY_SCORE: 41

## 2025-02-03 ASSESSMENT — ENCOUNTER SYMPTOMS
EYES NEGATIVE: 1
PSYCHIATRIC NEGATIVE: 1
ALLERGIC/IMMUNOLOGIC NEGATIVE: 1
FLANK PAIN: 1
CARDIOVASCULAR NEGATIVE: 1
ENDOCRINE NEGATIVE: 1
NEUROLOGICAL NEGATIVE: 1
RESPIRATORY NEGATIVE: 1
HEMATOLOGIC/LYMPHATIC NEGATIVE: 1
GASTROINTESTINAL NEGATIVE: 1
CONSTITUTIONAL NEGATIVE: 1

## 2025-02-03 NOTE — ED TRIAGE NOTES
Patient seen at [place]. Her for CT urogram. Patient reports RUQ pain started ~0000 last night. Vomiting and diarrhea after. Hx of GB removal, c-sections and tubal ligation.     Triage Assessment (Adult)       Row Name 02/03/25 1512          Triage Assessment    Airway WDL WDL        Respiratory WDL    Respiratory WDL WDL        Skin Circulation/Temperature WDL    Skin Circulation/Temperature WDL WDL        Cardiac WDL    Cardiac WDL X;rhythm     Pulse Rate & Regularity tachycardic        Peripheral/Neurovascular WDL    Peripheral Neurovascular WDL WDL        Cognitive/Neuro/Behavioral WDL    Cognitive/Neuro/Behavioral WDL WDL

## 2025-02-03 NOTE — ED PROVIDER NOTES
History     Chief Complaint   Patient presents with    Flank Pain     HPI  Xiomara Rowell is a 64 year old female who presents for further evaluation and care.  Patient was evaluated at outside facility where imaging was done which revealed concern for mild right hydronephrosis with hydroureter without obstructing stone.  Radiology had recommended CT urogram for follow-up imaging.  By report on arrival there was no ability to complete a CT urogram.  Patient's workup prior to presenting for care was reassuring including normal creatinine, urinalysis without infection.    On arrival on examination patient arrived by car with family reporting that after assessment and evaluation she had presented based on the recommendation for follow-up imaging.  She does report she has chronic low back pain and takes oxycodone 20 mg 4 times a day.  She also has a history of pancreatitis.  Symptoms began yesterday with right flank pain with nausea and vomiting but no diarrhea.  She has not had any fever or chills, she also reports no urinary symptoms.  She reports she did receive fluids and pain medication which provided some relief.  Patient reports no prior history of kidney stones     Allergies:  Allergies   Allergen Reactions    Contrast Dye Hives    Gabapentin Other (See Comments) and Palpitations    Ibuprofen Anaphylaxis    Shrimp Anaphylaxis    Morphine Nausea and Vomiting    Atorvastatin Muscle Pain (Myalgia)     Whole body aches  Whole body aches      Lillian-D [Diphenhydramine] Hives    Celecoxib      Other reaction(s): *Unknown    Clopidogrel      Other reaction(s): *Unknown    Duloxetine Hcl Other (See Comments)     Increases her depression    Ezetimibe Muscle Pain (Myalgia)    Furosemide Dizziness, Headache and Muscle Pain (Myalgia)     Other reaction(s): Muscle spasm    Lexapro [Escitalopram] Other (See Comments)     Dry mouth, stiff neck    Methocarbamol Itching    Mirtazapine      Other reaction(s): *Unknown     Oxymetazoline Hives    Prochlorperazine      Other reaction(s): *Unknown    Rosuvastatin Muscle Pain (Myalgia)     Muscle aches  Muscle aches      Wellbutrin [Bupropion] Other (See Comments)    Sodium Chloride Rash       Problem List:    Patient Active Problem List    Diagnosis Date Noted    Hesitancy 10/27/2014     Priority: Medium        Past Medical History:    Past Medical History:   Diagnosis Date    Allergic state     Anxiety     Arthritis     Depressive disorder        Past Surgical History:    Past Surgical History:   Procedure Laterality Date    CHOLECYSTECTOMY      FUSION CERVICAL ANTERIOR THREE+ LEVELS         Family History:    Family History   Problem Relation Age of Onset    Stomach Cancer Mother     Myocardial Infarction Mother     Diabetes Mother     Hyperlipidemia Mother     Colon Cancer Father     Cerebrovascular Disease Father     Hyperlipidemia Father     Diabetes Sister     Coronary Artery Disease Brother     Diabetes Brother        Social History:  Marital Status:   [2]  Social History     Tobacco Use    Smoking status: Every Day     Current packs/day: 0.25     Average packs/day: 0.3 packs/day for 6.0 years (1.5 ttl pk-yrs)     Types: Cigarettes    Smokeless tobacco: Never   Substance Use Topics    Alcohol use: No    Drug use: No        Medications:    HYDROmorphone (DILAUDID) 4 MG tablet  hydrOXYzine (ATARAX) 25 MG tablet  lactulose (CHRONULAC) 10 GM/15ML solution  lidocaine (LIDODERM) 5 % patch  medical cannabis (Patient's own supply)  naloxone (NARCAN) 4 MG/0.1ML nasal spray  nortriptyline (PAMELOR) 10 MG capsule  ondansetron (ZOFRAN-ODT) 4 MG ODT tab  orphenadrine ER (NORFLEX) 100 MG 12 hr tablet  oxyCODONE (OXYCONTIN) 15 MG 12 hr tablet  oxyCODONE (ROXICODONE) 10 MG immediate release tablet  valACYclovir (VALTREX) 500 MG tablet          Review of Systems   Constitutional: Negative.    HENT: Negative.     Eyes: Negative.    Respiratory: Negative.     Cardiovascular: Negative.   "  Gastrointestinal: Negative.    Endocrine: Negative.    Genitourinary:  Positive for flank pain.   Skin: Negative.    Allergic/Immunologic: Negative.    Neurological: Negative.    Hematological: Negative.    Psychiatric/Behavioral: Negative.     All other systems reviewed and are negative.      Physical Exam   BP: 99/59  Pulse: 107  Temp: 99.6  F (37.6  C)  Resp: 18  Height: 165.1 cm (5' 5\")  Weight: 66.2 kg (146 lb)  SpO2: 96 %      Physical Exam  Constitutional:       General: She is not in acute distress.     Appearance: Normal appearance. She is not ill-appearing, toxic-appearing or diaphoretic.   HENT:      Head: Normocephalic and atraumatic.      Right Ear: Tympanic membrane normal.      Left Ear: Tympanic membrane normal.      Nose: Nose normal.      Mouth/Throat:      Mouth: Mucous membranes are moist.   Eyes:      Extraocular Movements: Extraocular movements intact.      Pupils: Pupils are equal, round, and reactive to light.   Cardiovascular:      Rate and Rhythm: Normal rate and regular rhythm.      Pulses: Normal pulses.      Heart sounds: No murmur heard.     No friction rub. No gallop.   Pulmonary:      Effort: Pulmonary effort is normal. No respiratory distress.      Breath sounds: Normal breath sounds. No stridor. No wheezing, rhonchi or rales.   Chest:      Chest wall: No tenderness.   Abdominal:       Musculoskeletal:         General: Tenderness present. No swelling or deformity.      Cervical back: Normal range of motion and neck supple.        Back:       Right lower leg: No edema.   Skin:     Capillary Refill: Capillary refill takes less than 2 seconds.      Coloration: Skin is not jaundiced or pale.      Findings: No bruising, erythema, lesion or rash.   Neurological:      General: No focal deficit present.      Mental Status: She is alert and oriented to person, place, and time.      Cranial Nerves: No cranial nerve deficit.      Sensory: No sensory deficit.      Motor: No weakness.      " "Coordination: Coordination normal.      Gait: Gait normal.      Deep Tendon Reflexes: Reflexes normal.   Psychiatric:         Mood and Affect: Mood normal.         Behavior: Behavior normal.         Thought Content: Thought content normal.         Judgment: Judgment normal.         ED Course        Procedures              Critical Care time:  none       ED  medications:  Medications   HYDROmorphone (PF) (DILAUDID) injection 0.5 mg (0.5 mg Intravenous $Given 2/3/25 1843)   iopamidol (ISOVUE-370) solution 100 mL ( Intravenous Canceled Entry 2/3/25 1812)   ondansetron (ZOFRAN) injection 4 mg (4 mg Intravenous $Given 2/3/25 1651)   methylPREDNISolone Na Suc (solu-MEDROL) injection 125 mg (125 mg Intravenous $Given 2/3/25 1651)   iopamidol (ISOVUE-370) solution 71 mL (71 mLs Intravenous $Given 2/3/25 1748)   sodium chloride 0.9 % bag 500mL for CT scan flush use (100 mLs As instructed $Given 2/3/25 2218)     ED Vitals:  Vitals:    02/03/25 1512   BP: 99/59   Pulse: 107   Resp: 18   Temp: 99.6  F (37.6  C)   TempSrc: Oral   SpO2: 96%   Weight: 66.2 kg (146 lb)   Height: 1.651 m (5' 5\")     ED labs and imaging:  Results for orders placed or performed during the hospital encounter of 02/03/25 (from the past 24 hours)   CBC with platelets, differential    Narrative    The following orders were created for panel order CBC with platelets, differential.  Procedure                               Abnormality         Status                     ---------                               -----------         ------                     CBC with platelets and d...[[id number]]                      Final result                 Please view results for these tests on the individual orders.   Comprehensive metabolic panel   Result Value Ref Range    Sodium 141 135 - 145 mmol/L    Potassium 3.8 3.4 - 5.3 mmol/L    Carbon Dioxide (CO2) 26 22 - 29 mmol/L    Anion Gap 12 7 - 15 mmol/L    Urea Nitrogen 10.0 8.0 - 23.0 mg/dL    Creatinine 0.74 0.51 - " 0.95 mg/dL    GFR Estimate 90 >60 mL/min/1.73m2    Calcium 9.3 8.8 - 10.4 mg/dL    Chloride 103 98 - 107 mmol/L    Glucose 121 (H) 70 - 99 mg/dL    Alkaline Phosphatase 68 40 - 150 U/L    AST 25 0 - 45 U/L    ALT 20 0 - 50 U/L    Protein Total 7.7 6.4 - 8.3 g/dL    Albumin 4.7 3.5 - 5.2 g/dL    Bilirubin Total 0.4 <=1.2 mg/dL   [place] Draw    Narrative    The following orders were created for panel order [place] Draw.  Procedure                               Abnormality         Status                     ---------                               -----------         ------                     Extra Blue Top Tube[[id number]]                              Final result               Extra Red Top Tube[[id number]]                               Final result                 Please view results for these tests on the individual orders.   CBC with platelets and differential   Result Value Ref Range    WBC Count 9.5 4.0 - 11.0 10e3/uL    RBC Count 4.49 3.80 - 5.20 10e6/uL    Hemoglobin 13.3 11.7 - 15.7 g/dL    Hematocrit 39.7 35.0 - 47.0 %    MCV 88 78 - 100 fL    MCH 29.6 26.5 - 33.0 pg    MCHC 33.5 31.5 - 36.5 g/dL    RDW 13.8 10.0 - 15.0 %    Platelet Count 348 150 - 450 10e3/uL    % Neutrophils 81 %    % Lymphocytes 14 %    % Monocytes 5 %    % Eosinophils 0 %    % Basophils 0 %    % Immature Granulocytes 0 %    NRBCs per 100 WBC 0 <1 /100    Absolute Neutrophils 7.6 1.6 - 8.3 10e3/uL    Absolute Lymphocytes 1.3 0.8 - 5.3 10e3/uL    Absolute Monocytes 0.5 0.0 - 1.3 10e3/uL    Absolute Eosinophils 0.0 0.0 - 0.7 10e3/uL    Absolute Basophils 0.0 0.0 - 0.2 10e3/uL    Absolute Immature Granulocytes 0.0 <=0.4 10e3/uL    Absolute NRBCs 0.0 10e3/uL   Extra Blue Top Tube   Result Value Ref Range    Hold Specimen JIC    Extra Red Top Tube   Result Value Ref Range    Hold Specimen JIC    [place] Draw    Narrative    The following orders were created for panel order [place] Draw.  Procedure                               Abnormality          Status                     ---------                               -----------         ------                     Extra Green Top (Lithium...[[id number]]                      Final result                 Please view results for these tests on the individual orders.   Extra Green Top (Lithium Heparin) ON ICE   Result Value Ref Range    Hold Specimen [facility]    CT Urogram wo & w Contrast    Narrative    EXAM: CT UROGRAM WO and W CONTRAST  LOCATION: [facility]  DATE: 2/3/2025    INDICATION: Right flank pain. Abnormal CT w o contrast with right sided hydronephrosis and hydroureter (mild). CT urogram advised for follow up imaging. Imaging completed at outside facility.  COMPARISON: 10/7/2014.  TECHNIQUE: CT scan of the abdomen and pelvis using urogram technique with pre contrast, post contrast, and delayed images. Multiplanar reformats were obtained. Dose reduction techniques were used.   CONTRAST: 71 mL Isovue 370    FINDINGS:   LOWER CHEST: Normal.    HEPATOBILIARY: Prior cholecystectomy. Mild biliary ectasia without ductal calcification. No suspicious liver lesion.    PANCREAS: Normal.    SPLEEN: Normal.    ADRENAL GLANDS: Normal.    RIGHT KIDNEY/URETER: Mild hydronephrosis. Symmetric normal nephrogram. No mass. No calcification. No filling defect.    LEFT KIDNEY/URETER: No hydronephrosis. Symmetric normal nephrogram. No mass. No calcification. No filling defect.    BLADDER: No bladder wall thickening, abnormal enhancement, or calcification.    BOWEL: No free air, free fluid, or inflammatory change. No bowel wall thickening or abnormal enhancement. No obstruction. Normal caliber appendix.    LYMPH NODES: No abnormally enlarged mesenteric or retroperitoneal lymph nodes.    VASCULATURE: Moderate near circumferential calcified plaque in the infrarenal abdominal aorta and extending into the right common iliac artery. No aneurysm or definite stenosis.    PELVIC ORGANS: Normal.    MUSCULOSKELETAL:  Lower lumbar fusion posteriorly at L4-5.      Impression    IMPRESSION:  1.  Mild right hydronephrosis without additional evidence for significant urinary obstruction. No renal mass, calcification, or collecting system filling defect.        Assessments & Plan (with Medical Decision Making)   Assessment Summary and clinical Impression: 64-year-old female who presented with flank pain after workup at an outside facility revealing concern for mild right hydronephrosis with hydroureter without obstructing stone.  Radiology had recommended a follow-up CT urogram for further detail.  Patient arrived reporting that there was no ability to complete a CT urogram at the outside facility.  Patient arrived afebrile.  Blood pressure was 99/59.  Pulse was 107.  She was 96% on room air.  CT urogram today revealed mild right hydronephrosis without additional evidence for significant urinary obstruction.  She was discharged with plan for outpatient follow-up with her care team as medically necessary.    ED Course and plan:  Reviewed the medical record. Reviewed notes through Care Everywhere-see radiology interpretation in the medical record.  Patient did confirm that she had received IV contrast in the past and tolerated premedication with steroids.  Workup on arrival today revealed a normal hemogram.  Patient's electrolytes were within normal limits.  Normal liver enzymes.  Creatinine was also normal  Based on recommendation from outside facility after completing initial workup CT urogram with and without contrast was obtained which revealed no acute findings mild hydronephrosis was noted but no other additional findings to suggest significant urinary obstruction.  Patient was discharged with plan for follow-up with her care team with low threshold to return to reexamined if there are new concerns.    Disclaimer: This note consists of symbols derived from keyboarding, dictation and/or voice recognition software. As a result, there  may be errors in the script that have gone undetected. Please consider this when interpreting information found in this chart.   I have reviewed the nursing notes.    I have reviewed the findings, diagnosis, plan and need for follow up with the patient.           Medical Decision Making  The patient's presentation was of high complexity (abnormal CT without contrast obtained at outside facility prior to ED presentation for care, flank pain).    The patient's evaluation involved:  ordering and/or review of 2 test(s) in this encounter (follow-up imaging with CT urogram, intravenous medication)    The patient's management necessitated high risk (outpatient follow-up if there is new concerns).        New Prescriptions    No medications on file       Final diagnoses:   Abnormal CT scan - IMPRESSION: 1.  Mild right hydronephrosis without additional evidence for significant urinary obstruction. No renal mass, calcification, or collecting system filling defect.       2/3/2025   [facility] EMERGENCY DEPT       Enmanuel Richardson MD  02/03/25 2019

## 2025-02-04 NOTE — DISCHARGE INSTRUCTIONS
1) Imaging today did not reveal any new findings that would raise concern or require further immediate intervention and care.  Based on the initial findings completed at [facility] prior to presenting for care follow-up imaging today completed just revealed mild right hydronephrosis no additional findings of concern    2) You appear stable for discharge to home at this time with plan to follow-up with your clinic provider.  If you develop new concerns including fever, recurrent or persistent pain you should return to be reexamined.

## 2025-06-21 ENCOUNTER — HEALTH MAINTENANCE LETTER (OUTPATIENT)
Age: 65
End: 2025-06-21